# Patient Record
Sex: MALE | Race: WHITE
[De-identification: names, ages, dates, MRNs, and addresses within clinical notes are randomized per-mention and may not be internally consistent; named-entity substitution may affect disease eponyms.]

---

## 2018-04-13 ENCOUNTER — HOSPITAL ENCOUNTER (OUTPATIENT)
Dept: HOSPITAL 76 - LAB.WCP | Age: 73
Discharge: HOME | End: 2018-04-13
Attending: PHYSICIAN ASSISTANT
Payer: MEDICARE

## 2018-04-13 DIAGNOSIS — E78.5: ICD-10-CM

## 2018-04-13 DIAGNOSIS — Z12.5: ICD-10-CM

## 2018-04-13 DIAGNOSIS — I10: Primary | ICD-10-CM

## 2018-04-13 LAB
ALBUMIN DIAFP-MCNC: 4.4 G/DL (ref 3.2–5.5)
ALBUMIN/GLOB SERPL: 1.7 {RATIO} (ref 1–2.2)
ALP SERPL-CCNC: 62 IU/L (ref 42–121)
ALT SERPL W P-5'-P-CCNC: 23 IU/L (ref 10–60)
ANION GAP SERPL CALCULATED.4IONS-SCNC: 8 MMOL/L (ref 6–13)
AST SERPL W P-5'-P-CCNC: 20 IU/L (ref 10–42)
BASOPHILS NFR BLD AUTO: 0.1 10^3/UL (ref 0–0.1)
BASOPHILS NFR BLD AUTO: 1 %
BILIRUB BLD-MCNC: 1 MG/DL (ref 0.2–1)
BUN SERPL-MCNC: 20 MG/DL (ref 6–20)
CALCIUM UR-MCNC: 9.2 MG/DL (ref 8.5–10.3)
CHLORIDE SERPL-SCNC: 104 MMOL/L (ref 101–111)
CHOLEST SERPL-MCNC: 176 MG/DL
CO2 SERPL-SCNC: 26 MMOL/L (ref 21–32)
CREAT SERPLBLD-SCNC: 0.8 MG/DL (ref 0.6–1.2)
EOSINOPHIL # BLD AUTO: 0.2 10^3/UL (ref 0–0.7)
EOSINOPHIL NFR BLD AUTO: 3.4 %
ERYTHROCYTE [DISTWIDTH] IN BLOOD BY AUTOMATED COUNT: 13 % (ref 12–15)
GFRSERPLBLD MDRD-ARVRAT: 96 ML/MIN/{1.73_M2} (ref 89–?)
GLOBULIN SER-MCNC: 2.6 G/DL (ref 2.1–4.2)
GLUCOSE SERPL-MCNC: 99 MG/DL (ref 70–100)
HDLC SERPL-MCNC: 50 MG/DL
HDLC SERPL: 3.5 {RATIO} (ref ?–5)
HGB UR QL STRIP: 16.5 G/DL (ref 14–18)
LDLC SERPL CALC-MCNC: 92 MG/DL
LDLC/HDLC SERPL: 1.8 {RATIO} (ref ?–3.6)
LYMPHOCYTES # SPEC AUTO: 1.5 10^3/UL (ref 1.5–3.5)
LYMPHOCYTES NFR BLD AUTO: 26 %
MCH RBC QN AUTO: 32.1 PG (ref 27–31)
MCHC RBC AUTO-ENTMCNC: 34.2 G/DL (ref 32–36)
MCV RBC AUTO: 93.9 FL (ref 80–94)
MONOCYTES # BLD AUTO: 0.5 10^3/UL (ref 0–1)
MONOCYTES NFR BLD AUTO: 9.1 %
NEUTROPHILS # BLD AUTO: 3.5 10^3/UL (ref 1.5–6.6)
NEUTROPHILS # SNV AUTO: 5.7 X10^3/UL (ref 4.8–10.8)
NEUTROPHILS NFR BLD AUTO: 60.5 %
PDW BLD AUTO: 8.9 FL (ref 7.4–11.4)
PLATELET # BLD: 158 10^3/UL (ref 130–450)
PROT SPEC-MCNC: 7 G/DL (ref 6.7–8.2)
RBC MAR: 5.12 10^6/UL (ref 4.7–6.1)
SODIUM SERPLBLD-SCNC: 138 MMOL/L (ref 135–145)
VLDLC SERPL-SCNC: 34 MG/DL

## 2018-04-13 PROCEDURE — 80061 LIPID PANEL: CPT

## 2018-04-13 PROCEDURE — 85025 COMPLETE CBC W/AUTO DIFF WBC: CPT

## 2018-04-13 PROCEDURE — 83721 ASSAY OF BLOOD LIPOPROTEIN: CPT

## 2018-04-13 PROCEDURE — 80053 COMPREHEN METABOLIC PANEL: CPT

## 2018-04-13 PROCEDURE — 36415 COLL VENOUS BLD VENIPUNCTURE: CPT

## 2018-04-13 PROCEDURE — 84153 ASSAY OF PSA TOTAL: CPT

## 2018-10-04 ENCOUNTER — HOSPITAL ENCOUNTER (OUTPATIENT)
Dept: HOSPITAL 76 - LAB.WCP | Age: 73
End: 2018-10-04
Attending: PHYSICIAN ASSISTANT
Payer: MEDICARE

## 2018-10-04 DIAGNOSIS — I10: ICD-10-CM

## 2018-10-04 DIAGNOSIS — R73.9: Primary | ICD-10-CM

## 2018-10-04 DIAGNOSIS — E78.5: ICD-10-CM

## 2018-10-04 LAB
ALBUMIN DIAFP-MCNC: 4.3 G/DL (ref 3.2–5.5)
ALBUMIN/GLOB SERPL: 1.9 {RATIO} (ref 1–2.2)
ALP SERPL-CCNC: 64 IU/L (ref 42–121)
ALT SERPL W P-5'-P-CCNC: 21 IU/L (ref 10–60)
ANION GAP SERPL CALCULATED.4IONS-SCNC: 9 MMOL/L (ref 6–13)
AST SERPL W P-5'-P-CCNC: 21 IU/L (ref 10–42)
BILIRUB BLD-MCNC: 1.2 MG/DL (ref 0.2–1)
BUN SERPL-MCNC: 19 MG/DL (ref 6–20)
CALCIUM UR-MCNC: 9 MG/DL (ref 8.5–10.3)
CHLORIDE SERPL-SCNC: 103 MMOL/L (ref 101–111)
CHOLEST SERPL-MCNC: 151 MG/DL
CO2 SERPL-SCNC: 28 MMOL/L (ref 21–32)
CREAT SERPLBLD-SCNC: 0.8 MG/DL (ref 0.6–1.2)
GFRSERPLBLD MDRD-ARVRAT: 95 ML/MIN/{1.73_M2} (ref 89–?)
GLOBULIN SER-MCNC: 2.3 G/DL (ref 2.1–4.2)
GLUCOSE SERPL-MCNC: 96 MG/DL (ref 70–100)
HDLC SERPL-MCNC: 45 MG/DL
HDLC SERPL: 3.4 {RATIO} (ref ?–5)
LDLC SERPL CALC-MCNC: 74 MG/DL
LDLC/HDLC SERPL: 1.6 {RATIO} (ref ?–3.6)
PROT SPEC-MCNC: 6.6 G/DL (ref 6.7–8.2)
SODIUM SERPLBLD-SCNC: 140 MMOL/L (ref 135–145)
VLDLC SERPL-SCNC: 32 MG/DL

## 2018-10-04 PROCEDURE — 36415 COLL VENOUS BLD VENIPUNCTURE: CPT

## 2018-10-04 PROCEDURE — 80053 COMPREHEN METABOLIC PANEL: CPT

## 2018-10-04 PROCEDURE — 83721 ASSAY OF BLOOD LIPOPROTEIN: CPT

## 2018-10-04 PROCEDURE — 80061 LIPID PANEL: CPT

## 2019-04-01 ENCOUNTER — HOSPITAL ENCOUNTER (OUTPATIENT)
Dept: HOSPITAL 76 - LAB.WCP | Age: 74
Discharge: HOME | End: 2019-04-01
Attending: PHYSICIAN ASSISTANT
Payer: MEDICARE

## 2019-04-01 DIAGNOSIS — E78.5: Primary | ICD-10-CM

## 2019-04-01 DIAGNOSIS — M45.9: ICD-10-CM

## 2019-04-01 LAB
ALBUMIN DIAFP-MCNC: 4.2 G/DL (ref 3.2–5.5)
ALBUMIN/GLOB SERPL: 1.6 {RATIO} (ref 1–2.2)
ALP SERPL-CCNC: 64 IU/L (ref 42–121)
ALT SERPL W P-5'-P-CCNC: 23 IU/L (ref 10–60)
ANION GAP SERPL CALCULATED.4IONS-SCNC: 6 MMOL/L (ref 6–13)
AST SERPL W P-5'-P-CCNC: 23 IU/L (ref 10–42)
BASOPHILS NFR BLD AUTO: 0.1 10^3/UL (ref 0–0.1)
BASOPHILS NFR BLD AUTO: 1.7 %
BILIRUB BLD-MCNC: 1.3 MG/DL (ref 0.2–1)
BUN SERPL-MCNC: 19 MG/DL (ref 6–20)
CALCIUM UR-MCNC: 9.5 MG/DL (ref 8.5–10.3)
CHLORIDE SERPL-SCNC: 104 MMOL/L (ref 101–111)
CHOLEST SERPL-MCNC: 167 MG/DL
CO2 SERPL-SCNC: 27 MMOL/L (ref 21–32)
CREAT SERPLBLD-SCNC: 0.8 MG/DL (ref 0.6–1.2)
EOSINOPHIL # BLD AUTO: 0.1 10^3/UL (ref 0–0.7)
EOSINOPHIL NFR BLD AUTO: 2 %
ERYTHROCYTE [DISTWIDTH] IN BLOOD BY AUTOMATED COUNT: 13 % (ref 12–15)
GFRSERPLBLD MDRD-ARVRAT: 95 ML/MIN/{1.73_M2} (ref 89–?)
GLOBULIN SER-MCNC: 2.6 G/DL (ref 2.1–4.2)
GLUCOSE SERPL-MCNC: 103 MG/DL (ref 70–100)
HDLC SERPL-MCNC: 54 MG/DL
HDLC SERPL: 3.1 {RATIO} (ref ?–5)
HGB UR QL STRIP: 15.9 G/DL (ref 14–18)
LDLC SERPL CALC-MCNC: 81 MG/DL
LDLC/HDLC SERPL: 1.5 {RATIO} (ref ?–3.6)
LYMPHOCYTES # SPEC AUTO: 1.6 10^3/UL (ref 1.5–3.5)
LYMPHOCYTES NFR BLD AUTO: 24.6 %
MCH RBC QN AUTO: 32.4 PG (ref 27–31)
MCHC RBC AUTO-ENTMCNC: 34 G/DL (ref 32–36)
MCV RBC AUTO: 95.3 FL (ref 80–94)
MONOCYTES # BLD AUTO: 0.5 10^3/UL (ref 0–1)
MONOCYTES NFR BLD AUTO: 7.9 %
NEUTROPHILS # BLD AUTO: 4.2 10^3/UL (ref 1.5–6.6)
NEUTROPHILS # SNV AUTO: 6.5 X10^3/UL (ref 4.8–10.8)
NEUTROPHILS NFR BLD AUTO: 63.8 %
PDW BLD AUTO: 8.6 FL (ref 7.4–11.4)
PLATELET # BLD: 166 10^3/UL (ref 130–450)
PROT SPEC-MCNC: 6.8 G/DL (ref 6.7–8.2)
RBC MAR: 4.9 10^6/UL (ref 4.7–6.1)
SODIUM SERPLBLD-SCNC: 137 MMOL/L (ref 135–145)
VLDLC SERPL-SCNC: 32 MG/DL

## 2019-04-01 PROCEDURE — 83721 ASSAY OF BLOOD LIPOPROTEIN: CPT

## 2019-04-01 PROCEDURE — 36415 COLL VENOUS BLD VENIPUNCTURE: CPT

## 2019-04-01 PROCEDURE — 80053 COMPREHEN METABOLIC PANEL: CPT

## 2019-04-01 PROCEDURE — 80061 LIPID PANEL: CPT

## 2019-04-01 PROCEDURE — 85025 COMPLETE CBC W/AUTO DIFF WBC: CPT

## 2019-09-06 ENCOUNTER — HOSPITAL ENCOUNTER (OUTPATIENT)
Dept: HOSPITAL 76 - RT | Age: 74
Discharge: HOME | End: 2019-09-06
Attending: SURGERY
Payer: MEDICARE

## 2019-09-06 DIAGNOSIS — I10: Primary | ICD-10-CM

## 2019-09-06 PROCEDURE — 93005 ELECTROCARDIOGRAM TRACING: CPT

## 2019-09-10 ENCOUNTER — HOSPITAL ENCOUNTER (OUTPATIENT)
Dept: HOSPITAL 76 - SDS | Age: 74
Discharge: HOME | End: 2019-09-10
Attending: SURGERY
Payer: MEDICARE

## 2019-09-10 VITALS — DIASTOLIC BLOOD PRESSURE: 58 MMHG | SYSTOLIC BLOOD PRESSURE: 125 MMHG

## 2019-09-10 DIAGNOSIS — H54.3: ICD-10-CM

## 2019-09-10 DIAGNOSIS — D12.2: ICD-10-CM

## 2019-09-10 DIAGNOSIS — E78.5: ICD-10-CM

## 2019-09-10 DIAGNOSIS — M45.9: ICD-10-CM

## 2019-09-10 DIAGNOSIS — R73.9: ICD-10-CM

## 2019-09-10 DIAGNOSIS — Z12.11: Primary | ICD-10-CM

## 2019-09-10 DIAGNOSIS — I10: ICD-10-CM

## 2019-09-10 DIAGNOSIS — E66.9: ICD-10-CM

## 2019-09-10 DIAGNOSIS — D12.3: ICD-10-CM

## 2019-09-10 DIAGNOSIS — G47.30: ICD-10-CM

## 2019-09-10 DIAGNOSIS — Z90.49: ICD-10-CM

## 2019-09-10 PROCEDURE — 45380 COLONOSCOPY AND BIOPSY: CPT

## 2019-09-10 PROCEDURE — 0DBL8ZZ EXCISION OF TRANSVERSE COLON, VIA NATURAL OR ARTIFICIAL OPENING ENDOSCOPIC: ICD-10-PCS | Performed by: SURGERY

## 2019-09-10 PROCEDURE — 0DBK8ZZ EXCISION OF ASCENDING COLON, VIA NATURAL OR ARTIFICIAL OPENING ENDOSCOPIC: ICD-10-PCS | Performed by: SURGERY

## 2019-09-10 PROCEDURE — 45385 COLONOSCOPY W/LESION REMOVAL: CPT

## 2019-10-15 ENCOUNTER — HOSPITAL ENCOUNTER (OUTPATIENT)
Dept: HOSPITAL 76 - LAB.WCP | Age: 74
Discharge: HOME | End: 2019-10-15
Attending: PHYSICIAN ASSISTANT
Payer: MEDICARE

## 2019-10-15 DIAGNOSIS — E78.5: Primary | ICD-10-CM

## 2019-10-15 LAB
ALBUMIN DIAFP-MCNC: 4.3 G/DL (ref 3.2–5.5)
ALBUMIN/GLOB SERPL: 1.8 {RATIO} (ref 1–2.2)
ALP SERPL-CCNC: 57 IU/L (ref 42–121)
ALT SERPL W P-5'-P-CCNC: 22 IU/L (ref 10–60)
ANION GAP SERPL CALCULATED.4IONS-SCNC: 10 MMOL/L (ref 6–13)
AST SERPL W P-5'-P-CCNC: 19 IU/L (ref 10–42)
BILIRUB BLD-MCNC: 1 MG/DL (ref 0.2–1)
BUN SERPL-MCNC: 20 MG/DL (ref 6–20)
CALCIUM UR-MCNC: 9.2 MG/DL (ref 8.5–10.3)
CHLORIDE SERPL-SCNC: 103 MMOL/L (ref 101–111)
CHOLEST SERPL-MCNC: 178 MG/DL
CO2 SERPL-SCNC: 29 MMOL/L (ref 21–32)
CREAT SERPLBLD-SCNC: 0.9 MG/DL (ref 0.6–1.2)
GFRSERPLBLD MDRD-ARVRAT: 83 ML/MIN/{1.73_M2} (ref 89–?)
GLOBULIN SER-MCNC: 2.4 G/DL (ref 2.1–4.2)
GLUCOSE SERPL-MCNC: 101 MG/DL (ref 70–100)
HDLC SERPL-MCNC: 56 MG/DL
HDLC SERPL: 3.2 {RATIO} (ref ?–5)
LDLC SERPL CALC-MCNC: 93 MG/DL
LDLC/HDLC SERPL: 1.7 {RATIO} (ref ?–3.6)
PROT SPEC-MCNC: 6.7 G/DL (ref 6.7–8.2)
SODIUM SERPLBLD-SCNC: 142 MMOL/L (ref 135–145)
VLDLC SERPL-SCNC: 29 MG/DL

## 2019-10-15 PROCEDURE — 36415 COLL VENOUS BLD VENIPUNCTURE: CPT

## 2019-10-15 PROCEDURE — 80053 COMPREHEN METABOLIC PANEL: CPT

## 2019-10-15 PROCEDURE — 80061 LIPID PANEL: CPT

## 2019-10-15 PROCEDURE — 83721 ASSAY OF BLOOD LIPOPROTEIN: CPT

## 2020-04-13 ENCOUNTER — HOSPITAL ENCOUNTER (OUTPATIENT)
Dept: HOSPITAL 76 - LAB.WCP | Age: 75
Discharge: HOME | End: 2020-04-13
Attending: PHYSICIAN ASSISTANT
Payer: MEDICARE

## 2020-04-13 DIAGNOSIS — E78.5: Primary | ICD-10-CM

## 2020-04-13 LAB
ALBUMIN DIAFP-MCNC: 4.4 G/DL (ref 3.2–5.5)
ALBUMIN/GLOB SERPL: 1.6 {RATIO} (ref 1–2.2)
ALP SERPL-CCNC: 64 IU/L (ref 42–121)
ALT SERPL W P-5'-P-CCNC: 20 IU/L (ref 10–60)
ANION GAP SERPL CALCULATED.4IONS-SCNC: 6 MMOL/L (ref 6–13)
AST SERPL W P-5'-P-CCNC: 19 IU/L (ref 10–42)
BILIRUB BLD-MCNC: 0.8 MG/DL (ref 0.2–1)
BUN SERPL-MCNC: 19 MG/DL (ref 6–20)
CALCIUM UR-MCNC: 9.1 MG/DL (ref 8.5–10.3)
CHLORIDE SERPL-SCNC: 103 MMOL/L (ref 101–111)
CHOLEST SERPL-MCNC: 181 MG/DL
CO2 SERPL-SCNC: 27 MMOL/L (ref 21–32)
CREAT SERPLBLD-SCNC: 0.9 MG/DL (ref 0.6–1.2)
GLOBULIN SER-MCNC: 2.7 G/DL (ref 2.1–4.2)
GLUCOSE SERPL-MCNC: 91 MG/DL (ref 70–100)
HDLC SERPL-MCNC: 54 MG/DL
HDLC SERPL: 3.4 {RATIO} (ref ?–5)
LDLC SERPL CALC-MCNC: 90 MG/DL
LDLC/HDLC SERPL: 1.7 {RATIO} (ref ?–3.6)
PROT SPEC-MCNC: 7.1 G/DL (ref 6.7–8.2)
SODIUM SERPLBLD-SCNC: 136 MMOL/L (ref 135–145)
VLDLC SERPL-SCNC: 37 MG/DL

## 2020-04-13 PROCEDURE — 80053 COMPREHEN METABOLIC PANEL: CPT

## 2020-04-13 PROCEDURE — 80061 LIPID PANEL: CPT

## 2020-04-13 PROCEDURE — 36415 COLL VENOUS BLD VENIPUNCTURE: CPT

## 2020-04-13 PROCEDURE — 83721 ASSAY OF BLOOD LIPOPROTEIN: CPT

## 2020-10-13 ENCOUNTER — HOSPITAL ENCOUNTER (OUTPATIENT)
Dept: HOSPITAL 76 - LAB.WCP | Age: 75
Discharge: HOME | End: 2020-10-13
Attending: PHYSICIAN ASSISTANT
Payer: MEDICARE

## 2020-10-13 DIAGNOSIS — E78.5: Primary | ICD-10-CM

## 2020-10-13 LAB
ALBUMIN DIAFP-MCNC: 4.5 G/DL (ref 3.2–5.5)
ALBUMIN/GLOB SERPL: 1.7 {RATIO} (ref 1–2.2)
ALP SERPL-CCNC: 60 IU/L (ref 42–121)
ALT SERPL W P-5'-P-CCNC: 22 IU/L (ref 10–60)
ANION GAP SERPL CALCULATED.4IONS-SCNC: 8 MMOL/L (ref 6–13)
AST SERPL W P-5'-P-CCNC: 22 IU/L (ref 10–42)
BILIRUB BLD-MCNC: 1.3 MG/DL (ref 0.2–1)
BUN SERPL-MCNC: 21 MG/DL (ref 6–20)
CALCIUM UR-MCNC: 9.3 MG/DL (ref 8.5–10.3)
CHLORIDE SERPL-SCNC: 103 MMOL/L (ref 101–111)
CHOLEST SERPL-MCNC: 175 MG/DL
CO2 SERPL-SCNC: 27 MMOL/L (ref 21–32)
CREAT SERPLBLD-SCNC: 1 MG/DL (ref 0.6–1.2)
GLOBULIN SER-MCNC: 2.6 G/DL (ref 2.1–4.2)
GLUCOSE SERPL-MCNC: 96 MG/DL (ref 70–100)
HDLC SERPL-MCNC: 49 MG/DL
HDLC SERPL: 3.6 {RATIO} (ref ?–5)
LDLC SERPL CALC-MCNC: 104 MG/DL
LDLC/HDLC SERPL: 2.1 {RATIO} (ref ?–3.6)
PROT SPEC-MCNC: 7.1 G/DL (ref 6.7–8.2)
SODIUM SERPLBLD-SCNC: 138 MMOL/L (ref 135–145)
VLDLC SERPL-SCNC: 22 MG/DL

## 2020-10-13 PROCEDURE — 80053 COMPREHEN METABOLIC PANEL: CPT

## 2020-10-13 PROCEDURE — 80061 LIPID PANEL: CPT

## 2020-10-13 PROCEDURE — 36415 COLL VENOUS BLD VENIPUNCTURE: CPT

## 2020-10-13 PROCEDURE — 83721 ASSAY OF BLOOD LIPOPROTEIN: CPT

## 2020-11-21 ENCOUNTER — HOSPITAL ENCOUNTER (OUTPATIENT)
Age: 75
End: 2020-11-21
Payer: MEDICARE

## 2020-11-21 DIAGNOSIS — Z11.59: Primary | ICD-10-CM

## 2020-11-21 PROCEDURE — 87635 SARS-COV-2 COVID-19 AMP PRB: CPT

## 2020-11-24 ENCOUNTER — HOSPITAL ENCOUNTER (OUTPATIENT)
Age: 75
End: 2020-11-24
Payer: MEDICARE

## 2020-11-24 DIAGNOSIS — R07.9: Primary | ICD-10-CM

## 2020-11-24 DIAGNOSIS — I10: ICD-10-CM

## 2020-11-24 DIAGNOSIS — E78.5: ICD-10-CM

## 2020-11-24 PROCEDURE — 78452 HT MUSCLE IMAGE SPECT MULT: CPT

## 2020-11-24 PROCEDURE — 93017 CV STRESS TEST TRACING ONLY: CPT

## 2020-11-24 PROCEDURE — A9502 TC99M TETROFOSMIN: HCPCS

## 2020-11-25 NOTE — DI.NM.S_ITS
DATE OF SERVICE:  11/24/2020  
   
PROCEDURE:  Exercise perfusion study.  
   
INDICATIONS:  Chest pain with underlying hypertension and hyperlipidemia.  
   
RADIOPHARMACEUTICAL:  26.3 millicurie technetium-99m Myoview IV was injected at  
stress and 25.1 millicurie technetium-99m Myoview IV was injected at rest.  
   
CARDIAC STRESS:  The patient underwent exercise perfusion study under the  
supervision of an attending staff.  He walked on Mateo protocol for 9 minutes  
and achieved 94 percent of target heart rate and normal blood pressure response.  
No chest pain or anginal symptoms.  Baseline rhythm was sinus.  During stress,  
no ischemic changes.  Occasional PACs without any significant sustained  
arrhythmias.  The patient achieved 10.1 METs of workload and functional aerobic  
impairment -36 percent.  
   
RAW DATA:  There is increased subdiaphragmatic activity.  
   
GATED STUDY:  Resting LV ejection fraction 66 percent and stress LV ejection  
fraction 76 percent.  Resting end-diastolic volume 96 mL.  TID ratio 0.98, which  
is within normal limits.  Lung/heart ratio 0.32, which is within normal limits.  
   
MYOCARDIAL PERFUSION:  Stress supine, resting supine and stress prone images  
were compared to each other.  Stress supine and resting supine images revealed a  
small size, mildly decreased perfusion of basal inferior wall and inferoapex,  
which got completely resolved during prone images, suggestive of diaphragmatic  
tissue attenuation artifact.  Prone images revealed normal myocardial perfusion.  
   
CONCLUSION:  I will call this study a normal myocardial perfusion study with  
evidence of diaphragmatic tissue attenuation artifact ,which got resolved during  
prone images.  Excellent exercise capacity.  Next functional aerobic impairment  
-36 percent.  No ischemic EKG changes.  Normal hemodynamic response.  Preserved  
left ventricular function.  Overall this is a low-risk exercise perfusion study.  
   
   
   
Malik Judd - MRN: [id number]  
ARSEN/gautam/milind  
doc#: [id number]/job#:  [id number]  
dd: 11/25/2020 17:51:00 dt: 11/25/2020 20:20:00  
DICTATING MD/COPIES TO: Zo Lei MD; THEE He  
COPIES MNE: ANA;

## 2021-04-14 ENCOUNTER — HOSPITAL ENCOUNTER (OUTPATIENT)
Dept: HOSPITAL 76 - LAB.WCP | Age: 76
Discharge: HOME | End: 2021-04-14
Attending: PHYSICIAN ASSISTANT
Payer: MEDICARE

## 2021-04-14 DIAGNOSIS — R73.9: ICD-10-CM

## 2021-04-14 DIAGNOSIS — E78.5: Primary | ICD-10-CM

## 2021-04-14 LAB
ALBUMIN DIAFP-MCNC: 4.5 G/DL (ref 3.2–5.5)
ALBUMIN/GLOB SERPL: 1.9 {RATIO} (ref 1–2.2)
ALP SERPL-CCNC: 53 IU/L (ref 42–121)
ALT SERPL W P-5'-P-CCNC: 24 IU/L (ref 10–60)
ANION GAP SERPL CALCULATED.4IONS-SCNC: 8 MMOL/L (ref 6–13)
AST SERPL W P-5'-P-CCNC: 21 IU/L (ref 10–42)
BILIRUB BLD-MCNC: 1.2 MG/DL (ref 0.2–1)
BUN SERPL-MCNC: 22 MG/DL (ref 6–20)
CALCIUM UR-MCNC: 9.9 MG/DL (ref 8.5–10.3)
CHLORIDE SERPL-SCNC: 108 MMOL/L (ref 101–111)
CHOLEST SERPL-MCNC: 195 MG/DL
CO2 SERPL-SCNC: 27 MMOL/L (ref 21–32)
CREAT SERPLBLD-SCNC: 0.9 MG/DL (ref 0.6–1.2)
EST. AVERAGE GLUCOSE BLD GHB EST-MCNC: 111 MG/DL (ref 70–100)
GFRSERPLBLD MDRD-ARVRAT: 83 ML/MIN/{1.73_M2} (ref 89–?)
GLOBULIN SER-MCNC: 2.4 G/DL (ref 2.1–4.2)
GLUCOSE SERPL-MCNC: 100 MG/DL (ref 70–100)
HBA1C MFR BLD HPLC: 5.5 % (ref 4.27–6.07)
HDLC SERPL-MCNC: 57 MG/DL
HDLC SERPL: 3.4 {RATIO} (ref ?–5)
LDLC SERPL CALC-MCNC: 104 MG/DL
LDLC/HDLC SERPL: 1.8 {RATIO} (ref ?–3.6)
POTASSIUM SERPL-SCNC: 4.6 MMOL/L (ref 3.5–5)
PROT SPEC-MCNC: 6.9 G/DL (ref 6.7–8.2)
SODIUM SERPLBLD-SCNC: 143 MMOL/L (ref 135–145)
TRIGL P FAST SERPL-MCNC: 170 MG/DL
VLDLC SERPL-SCNC: 34 MG/DL

## 2021-04-14 PROCEDURE — 36415 COLL VENOUS BLD VENIPUNCTURE: CPT

## 2021-04-14 PROCEDURE — 83721 ASSAY OF BLOOD LIPOPROTEIN: CPT

## 2021-04-14 PROCEDURE — 80053 COMPREHEN METABOLIC PANEL: CPT

## 2021-04-14 PROCEDURE — 83036 HEMOGLOBIN GLYCOSYLATED A1C: CPT

## 2021-04-14 PROCEDURE — 80061 LIPID PANEL: CPT

## 2021-10-12 ENCOUNTER — HOSPITAL ENCOUNTER (OUTPATIENT)
Dept: HOSPITAL 76 - LAB.WCP | Age: 76
Discharge: HOME | End: 2021-10-12
Attending: PHYSICIAN ASSISTANT
Payer: MEDICARE

## 2021-10-12 DIAGNOSIS — E78.5: Primary | ICD-10-CM

## 2021-10-12 LAB
ALBUMIN DIAFP-MCNC: 4.5 G/DL (ref 3.2–5.5)
ALBUMIN/GLOB SERPL: 1.8 {RATIO} (ref 1–2.2)
ALP SERPL-CCNC: 52 IU/L (ref 42–121)
ALT SERPL W P-5'-P-CCNC: 21 IU/L (ref 10–60)
ANION GAP SERPL CALCULATED.4IONS-SCNC: 9 MMOL/L (ref 6–13)
AST SERPL W P-5'-P-CCNC: 22 IU/L (ref 10–42)
BILIRUB BLD-MCNC: 1.5 MG/DL (ref 0.2–1)
BUN SERPL-MCNC: 22 MG/DL (ref 6–20)
CALCIUM UR-MCNC: 9.6 MG/DL (ref 8.5–10.3)
CHLORIDE SERPL-SCNC: 102 MMOL/L (ref 101–111)
CHOLEST SERPL-MCNC: 170 MG/DL
CO2 SERPL-SCNC: 28 MMOL/L (ref 21–32)
CREAT SERPLBLD-SCNC: 1 MG/DL (ref 0.6–1.2)
GFRSERPLBLD MDRD-ARVRAT: 73 ML/MIN/{1.73_M2} (ref 89–?)
GLOBULIN SER-MCNC: 2.5 G/DL (ref 2.1–4.2)
GLUCOSE SERPL-MCNC: 100 MG/DL (ref 70–100)
HDLC SERPL-MCNC: 51 MG/DL
HDLC SERPL: 3.3 {RATIO} (ref ?–5)
LDLC SERPL CALC-MCNC: 92 MG/DL
LDLC/HDLC SERPL: 1.8 {RATIO} (ref ?–3.6)
POTASSIUM SERPL-SCNC: 4.4 MMOL/L (ref 3.5–5)
PROT SPEC-MCNC: 7 G/DL (ref 6.7–8.2)
SODIUM SERPLBLD-SCNC: 139 MMOL/L (ref 135–145)
TRIGL P FAST SERPL-MCNC: 133 MG/DL
VLDLC SERPL-SCNC: 27 MG/DL

## 2021-10-12 PROCEDURE — 83721 ASSAY OF BLOOD LIPOPROTEIN: CPT

## 2021-10-12 PROCEDURE — 80053 COMPREHEN METABOLIC PANEL: CPT

## 2021-10-12 PROCEDURE — 36415 COLL VENOUS BLD VENIPUNCTURE: CPT

## 2021-10-12 PROCEDURE — 80061 LIPID PANEL: CPT

## 2022-04-12 ENCOUNTER — HOSPITAL ENCOUNTER (OUTPATIENT)
Dept: HOSPITAL 76 - LAB.N | Age: 77
Discharge: HOME | End: 2022-04-12
Attending: PHYSICIAN ASSISTANT
Payer: MEDICARE

## 2022-04-12 DIAGNOSIS — E78.5: Primary | ICD-10-CM

## 2022-04-12 DIAGNOSIS — I10: ICD-10-CM

## 2022-04-12 LAB
ALBUMIN DIAFP-MCNC: 4.2 G/DL (ref 3.2–5.5)
ALBUMIN/GLOB SERPL: 1.5 {RATIO} (ref 1–2.2)
ALP SERPL-CCNC: 67 IU/L (ref 42–121)
ALT SERPL W P-5'-P-CCNC: 22 IU/L (ref 10–60)
ANION GAP SERPL CALCULATED.4IONS-SCNC: 8 MMOL/L (ref 6–13)
AST SERPL W P-5'-P-CCNC: 20 IU/L (ref 10–42)
BASOPHILS NFR BLD AUTO: 0.1 10^3/UL (ref 0–0.1)
BASOPHILS NFR BLD AUTO: 0.8 %
BILIRUB BLD-MCNC: 0.9 MG/DL (ref 0.2–1)
BUN SERPL-MCNC: 16 MG/DL (ref 6–20)
CALCIUM UR-MCNC: 9.6 MG/DL (ref 8.5–10.3)
CHLORIDE SERPL-SCNC: 102 MMOL/L (ref 101–111)
CHOLEST SERPL-MCNC: 174 MG/DL
CO2 SERPL-SCNC: 29 MMOL/L (ref 21–32)
CREAT SERPLBLD-SCNC: 1 MG/DL (ref 0.6–1.2)
EOSINOPHIL # BLD AUTO: 0.1 10^3/UL (ref 0–0.7)
EOSINOPHIL NFR BLD AUTO: 2 %
ERYTHROCYTE [DISTWIDTH] IN BLOOD BY AUTOMATED COUNT: 12.7 % (ref 12–15)
GFRSERPLBLD MDRD-ARVRAT: 73 ML/MIN/{1.73_M2} (ref 89–?)
GLOBULIN SER-MCNC: 2.8 G/DL (ref 2.1–4.2)
GLUCOSE SERPL-MCNC: 107 MG/DL (ref 70–100)
HCT VFR BLD AUTO: 49.7 % (ref 42–52)
HDLC SERPL-MCNC: 47 MG/DL
HDLC SERPL: 3.7 {RATIO} (ref ?–5)
HGB UR QL STRIP: 16.7 G/DL (ref 14–18)
LDLC SERPL CALC-MCNC: 72 MG/DL
LDLC/HDLC SERPL: 1.5 {RATIO} (ref ?–3.6)
LYMPHOCYTES # SPEC AUTO: 1.4 10^3/UL (ref 1.5–3.5)
LYMPHOCYTES NFR BLD AUTO: 21.8 %
MCH RBC QN AUTO: 32.4 PG (ref 27–31)
MCHC RBC AUTO-ENTMCNC: 33.6 G/DL (ref 32–36)
MCV RBC AUTO: 96.5 FL (ref 80–94)
MONOCYTES # BLD AUTO: 0.6 10^3/UL (ref 0–1)
MONOCYTES NFR BLD AUTO: 8.7 %
NEUTROPHILS # BLD AUTO: 4.3 10^3/UL (ref 1.5–6.6)
NEUTROPHILS # SNV AUTO: 6.5 X10^3/UL (ref 4.8–10.8)
NEUTROPHILS NFR BLD AUTO: 66.2 %
NRBC # BLD AUTO: 0 /100WBC
NRBC # BLD AUTO: 0 X10^3/UL
PDW BLD AUTO: 10.1 FL (ref 7.4–11.4)
PLATELET # BLD: 180 10^3/UL (ref 130–450)
POTASSIUM SERPL-SCNC: 4.6 MMOL/L (ref 3.5–5)
PROT SPEC-MCNC: 7 G/DL (ref 6.7–8.2)
RBC MAR: 5.15 10^6/UL (ref 4.7–6.1)
SODIUM SERPLBLD-SCNC: 139 MMOL/L (ref 135–145)
TRIGL P FAST SERPL-MCNC: 275 MG/DL
VLDLC SERPL-SCNC: 55 MG/DL

## 2022-04-12 PROCEDURE — 83721 ASSAY OF BLOOD LIPOPROTEIN: CPT

## 2022-04-12 PROCEDURE — 80061 LIPID PANEL: CPT

## 2022-04-12 PROCEDURE — 80053 COMPREHEN METABOLIC PANEL: CPT

## 2022-04-12 PROCEDURE — 36415 COLL VENOUS BLD VENIPUNCTURE: CPT

## 2022-04-12 PROCEDURE — 85025 COMPLETE CBC W/AUTO DIFF WBC: CPT

## 2022-10-28 ENCOUNTER — HOSPITAL ENCOUNTER (OUTPATIENT)
Age: 77
End: 2022-10-28
Payer: MEDICARE

## 2022-10-28 DIAGNOSIS — Z01.812: ICD-10-CM

## 2022-10-28 DIAGNOSIS — Z20.822: Primary | ICD-10-CM

## 2022-10-28 PROCEDURE — 87635 SARS-COV-2 COVID-19 AMP PRB: CPT

## 2022-10-31 ENCOUNTER — HOSPITAL ENCOUNTER (OUTPATIENT)
Age: 77
Discharge: HOME | End: 2022-10-31
Payer: MEDICARE

## 2022-10-31 VITALS
TEMPERATURE: 97.52 F | DIASTOLIC BLOOD PRESSURE: 63 MMHG | OXYGEN SATURATION: 97 % | RESPIRATION RATE: 11 BRPM | HEART RATE: 64 BPM | SYSTOLIC BLOOD PRESSURE: 117 MMHG

## 2022-10-31 VITALS
RESPIRATION RATE: 24 BRPM | HEART RATE: 65 BPM | SYSTOLIC BLOOD PRESSURE: 125 MMHG | DIASTOLIC BLOOD PRESSURE: 70 MMHG | OXYGEN SATURATION: 98 %

## 2022-10-31 VITALS
TEMPERATURE: 97.6 F | RESPIRATION RATE: 12 BRPM | HEART RATE: 62 BPM | SYSTOLIC BLOOD PRESSURE: 137 MMHG | OXYGEN SATURATION: 98 % | DIASTOLIC BLOOD PRESSURE: 66 MMHG

## 2022-10-31 VITALS
HEART RATE: 64 BPM | OXYGEN SATURATION: 99 % | DIASTOLIC BLOOD PRESSURE: 91 MMHG | SYSTOLIC BLOOD PRESSURE: 137 MMHG | RESPIRATION RATE: 14 BRPM

## 2022-10-31 VITALS
HEART RATE: 72 BPM | OXYGEN SATURATION: 99 % | RESPIRATION RATE: 19 BRPM | DIASTOLIC BLOOD PRESSURE: 73 MMHG | SYSTOLIC BLOOD PRESSURE: 143 MMHG

## 2022-10-31 VITALS — BODY MASS INDEX: 33.9 KG/M2

## 2022-10-31 VITALS
HEART RATE: 86 BPM | TEMPERATURE: 98.6 F | SYSTOLIC BLOOD PRESSURE: 167 MMHG | DIASTOLIC BLOOD PRESSURE: 79 MMHG | OXYGEN SATURATION: 97 % | RESPIRATION RATE: 16 BRPM

## 2022-10-31 DIAGNOSIS — Z12.11: Primary | ICD-10-CM

## 2022-10-31 DIAGNOSIS — Z86.010: ICD-10-CM

## 2022-10-31 DIAGNOSIS — D12.6: ICD-10-CM

## 2022-10-31 DIAGNOSIS — K64.9: ICD-10-CM

## 2022-10-31 PROCEDURE — 45384 COLONOSCOPY W/LESION REMOVAL: CPT

## 2022-10-31 PROCEDURE — 0DJD8ZZ INSPECTION OF LOWER INTESTINAL TRACT, VIA NATURAL OR ARTIFICIAL OPENING ENDOSCOPIC: ICD-10-PCS | Performed by: INTERNAL MEDICINE

## 2023-04-13 ENCOUNTER — HOSPITAL ENCOUNTER (OUTPATIENT)
Dept: HOSPITAL 76 - LAB.N | Age: 78
Discharge: HOME | End: 2023-04-13
Attending: PHYSICIAN ASSISTANT
Payer: MEDICARE

## 2023-04-13 DIAGNOSIS — E78.5: Primary | ICD-10-CM

## 2023-04-13 LAB
ALBUMIN DIAFP-MCNC: 4.5 G/DL (ref 3.2–5.5)
ALBUMIN/GLOB SERPL: 1.7 {RATIO} (ref 1–2.2)
ALP SERPL-CCNC: 64 IU/L (ref 42–121)
ALT SERPL W P-5'-P-CCNC: 23 IU/L (ref 10–60)
ANION GAP SERPL CALCULATED.4IONS-SCNC: 5 MMOL/L (ref 6–13)
AST SERPL W P-5'-P-CCNC: 22 IU/L (ref 10–42)
BILIRUB BLD-MCNC: 0.9 MG/DL (ref 0.2–1)
BUN SERPL-MCNC: 23 MG/DL (ref 6–20)
CALCIUM UR-MCNC: 9.2 MG/DL (ref 8.5–10.3)
CHLORIDE SERPL-SCNC: 107 MMOL/L (ref 101–111)
CHOLEST SERPL-MCNC: 193 MG/DL
CO2 SERPL-SCNC: 27 MMOL/L (ref 21–32)
CREAT SERPLBLD-SCNC: 0.9 MG/DL (ref 0.6–1.2)
GFRSERPLBLD MDRD-ARVRAT: 82 ML/MIN/{1.73_M2} (ref 89–?)
GLOBULIN SER-MCNC: 2.7 G/DL (ref 2.1–4.2)
GLUCOSE SERPL-MCNC: 95 MG/DL (ref 70–100)
HDLC SERPL-MCNC: 57 MG/DL
HDLC SERPL: 3.4 {RATIO} (ref ?–5)
LDLC SERPL CALC-MCNC: 108 MG/DL
LDLC/HDLC SERPL: 1.9 {RATIO} (ref ?–3.6)
POTASSIUM SERPL-SCNC: 4.3 MMOL/L (ref 3.5–5)
PROT SPEC-MCNC: 7.2 G/DL (ref 6.7–8.2)
SODIUM SERPLBLD-SCNC: 139 MMOL/L (ref 135–145)
TRIGL P FAST SERPL-MCNC: 139 MG/DL
VLDLC SERPL-SCNC: 28 MG/DL

## 2023-04-13 PROCEDURE — 80053 COMPREHEN METABOLIC PANEL: CPT

## 2023-04-13 PROCEDURE — 80061 LIPID PANEL: CPT

## 2023-04-13 PROCEDURE — 36415 COLL VENOUS BLD VENIPUNCTURE: CPT

## 2023-04-13 PROCEDURE — 83721 ASSAY OF BLOOD LIPOPROTEIN: CPT

## 2023-10-09 ENCOUNTER — HOSPITAL ENCOUNTER (OUTPATIENT)
Age: 78
Discharge: HOME | End: 2023-10-09
Payer: MEDICARE

## 2023-10-09 VITALS
DIASTOLIC BLOOD PRESSURE: 78 MMHG | SYSTOLIC BLOOD PRESSURE: 130 MMHG | TEMPERATURE: 97.4 F | OXYGEN SATURATION: 97 % | RESPIRATION RATE: 16 BRPM | HEART RATE: 73 BPM

## 2023-10-09 VITALS
RESPIRATION RATE: 23 BRPM | DIASTOLIC BLOOD PRESSURE: 76 MMHG | SYSTOLIC BLOOD PRESSURE: 156 MMHG | HEART RATE: 60 BPM | OXYGEN SATURATION: 100 %

## 2023-10-09 VITALS
OXYGEN SATURATION: 99 % | DIASTOLIC BLOOD PRESSURE: 71 MMHG | RESPIRATION RATE: 99 BRPM | SYSTOLIC BLOOD PRESSURE: 128 MMHG | HEART RATE: 64 BPM

## 2023-10-09 VITALS — BODY MASS INDEX: 32.8 KG/M2

## 2023-10-09 VITALS
OXYGEN SATURATION: 100 % | DIASTOLIC BLOOD PRESSURE: 93 MMHG | RESPIRATION RATE: 20 BRPM | SYSTOLIC BLOOD PRESSURE: 168 MMHG | HEART RATE: 64 BPM

## 2023-10-09 VITALS
RESPIRATION RATE: 11 BRPM | DIASTOLIC BLOOD PRESSURE: 80 MMHG | HEART RATE: 67 BPM | OXYGEN SATURATION: 97 % | SYSTOLIC BLOOD PRESSURE: 150 MMHG

## 2023-10-09 VITALS
OXYGEN SATURATION: 100 % | HEART RATE: 64 BPM | DIASTOLIC BLOOD PRESSURE: 85 MMHG | RESPIRATION RATE: 20 BRPM | SYSTOLIC BLOOD PRESSURE: 162 MMHG

## 2023-10-09 DIAGNOSIS — Z86.010: ICD-10-CM

## 2023-10-09 DIAGNOSIS — K63.89: ICD-10-CM

## 2023-10-09 DIAGNOSIS — Z12.11: Primary | ICD-10-CM

## 2023-10-09 PROCEDURE — 0DJ08ZZ INSPECTION OF UPPER INTESTINAL TRACT, VIA NATURAL OR ARTIFICIAL OPENING ENDOSCOPIC: ICD-10-PCS | Performed by: INTERNAL MEDICINE

## 2023-10-09 PROCEDURE — 45380 COLONOSCOPY AND BIOPSY: CPT

## 2023-10-09 PROCEDURE — 0DJD8ZZ INSPECTION OF LOWER INTESTINAL TRACT, VIA NATURAL OR ARTIFICIAL OPENING ENDOSCOPIC: ICD-10-PCS | Performed by: INTERNAL MEDICINE

## 2023-10-27 ENCOUNTER — HOSPITAL ENCOUNTER (OUTPATIENT)
Dept: HOSPITAL 76 - LAB.N | Age: 78
Discharge: HOME | End: 2023-10-27
Attending: PHYSICIAN ASSISTANT
Payer: MEDICARE

## 2023-10-27 DIAGNOSIS — E78.5: ICD-10-CM

## 2023-10-27 DIAGNOSIS — I10: Primary | ICD-10-CM

## 2023-10-27 LAB
ALBUMIN DIAFP-MCNC: 4.4 G/DL (ref 3.2–5.5)
ALBUMIN/GLOB SERPL: 2 {RATIO} (ref 1–2.2)
ALP SERPL-CCNC: 59 IU/L (ref 42–121)
ALT SERPL W P-5'-P-CCNC: 21 IU/L (ref 10–60)
ANION GAP SERPL CALCULATED.4IONS-SCNC: 7 MMOL/L (ref 6–13)
AST SERPL W P-5'-P-CCNC: 18 IU/L (ref 10–42)
BASOPHILS NFR BLD AUTO: 0.1 10^3/UL (ref 0–0.1)
BASOPHILS NFR BLD AUTO: 0.8 %
BILIRUB BLD-MCNC: 0.8 MG/DL (ref 0.2–1)
BUN SERPL-MCNC: 16 MG/DL (ref 6–20)
CALCIUM UR-MCNC: 9.3 MG/DL (ref 8.5–10.3)
CHLORIDE SERPL-SCNC: 107 MMOL/L (ref 101–111)
CHOLEST SERPL-MCNC: 166 MG/DL
CO2 SERPL-SCNC: 26 MMOL/L (ref 21–32)
CREAT SERPLBLD-SCNC: 0.9 MG/DL (ref 0.6–1.3)
EOSINOPHIL # BLD AUTO: 0.2 10^3/UL (ref 0–0.7)
EOSINOPHIL NFR BLD AUTO: 2.7 %
ERYTHROCYTE [DISTWIDTH] IN BLOOD BY AUTOMATED COUNT: 12.8 % (ref 12–15)
GFRSERPLBLD MDRD-ARVRAT: 82 ML/MIN/{1.73_M2} (ref 89–?)
GLOBULIN SER-MCNC: 2.2 G/DL (ref 2.1–4.2)
GLUCOSE SERPL-MCNC: 91 MG/DL (ref 74–104)
HCT VFR BLD AUTO: 47.5 % (ref 42–52)
HDLC SERPL-MCNC: 53 MG/DL
HDLC SERPL: 3.1 {RATIO} (ref ?–5)
HGB UR QL STRIP: 15.9 G/DL (ref 14–18)
LDLC SERPL CALC-MCNC: 87 MG/DL
LDLC/HDLC SERPL: 1.6 {RATIO} (ref ?–3.6)
LYMPHOCYTES # SPEC AUTO: 2 10^3/UL (ref 1.5–3.5)
LYMPHOCYTES NFR BLD AUTO: 31.9 %
MCH RBC QN AUTO: 32.4 PG (ref 27–31)
MCHC RBC AUTO-ENTMCNC: 33.5 G/DL (ref 32–36)
MCV RBC AUTO: 96.9 FL (ref 80–94)
MONOCYTES # BLD AUTO: 0.6 10^3/UL (ref 0–1)
MONOCYTES NFR BLD AUTO: 9 %
NEUTROPHILS # BLD AUTO: 3.4 10^3/UL (ref 1.5–6.6)
NEUTROPHILS # SNV AUTO: 6.2 X10^3/UL (ref 4.8–10.8)
NEUTROPHILS NFR BLD AUTO: 55.3 %
NRBC # BLD AUTO: 0 /100WBC
NRBC # BLD AUTO: 0 X10^3/UL
PDW BLD AUTO: 10.2 FL (ref 7.4–11.4)
PLATELET # BLD: 166 10^3/UL (ref 130–450)
POTASSIUM SERPL-SCNC: 4.1 MMOL/L (ref 3.5–4.5)
PROT SPEC-MCNC: 6.6 G/DL (ref 6.4–8.9)
RBC MAR: 4.9 10^6/UL (ref 4.7–6.1)
SODIUM SERPLBLD-SCNC: 140 MMOL/L (ref 135–145)
TRIGL P FAST SERPL-MCNC: 132 MG/DL (ref 48–352)
VLDLC SERPL-SCNC: 26 MG/DL

## 2023-10-27 PROCEDURE — 36415 COLL VENOUS BLD VENIPUNCTURE: CPT

## 2023-10-27 PROCEDURE — 83721 ASSAY OF BLOOD LIPOPROTEIN: CPT

## 2023-10-27 PROCEDURE — 80053 COMPREHEN METABOLIC PANEL: CPT

## 2023-10-27 PROCEDURE — 85025 COMPLETE CBC W/AUTO DIFF WBC: CPT

## 2023-10-27 PROCEDURE — 80061 LIPID PANEL: CPT

## 2023-12-01 ENCOUNTER — HOSPITAL ENCOUNTER (OUTPATIENT)
Dept: HOSPITAL 76 - DI.N | Age: 78
Discharge: HOME | End: 2023-12-01
Attending: NURSE PRACTITIONER
Payer: MEDICARE

## 2023-12-01 DIAGNOSIS — R05.9: Primary | ICD-10-CM

## 2023-12-01 NOTE — XRAY REPORT
PROCEDURE:  Chest 2 View X-Ray

 

INDICATIONS:  PRODUCTIVE COUGH

 

TECHNIQUE:  2 views of the chest were acquired.  

 

COMPARISON:  None.

 

FINDINGS:  

 

Surgical changes and devices:  None.  

 

Lungs and pleura:  No pleural effusions or pneumothorax.  Lungs are clear.  

 

Mediastinum:  Mediastinal contours appear normal.  Heart size is normal.  

 

Bones and chest wall:  No suspicious bony lesions.  Overlying soft tissues appear unremarkable. Mild 
multilevel degenerative changes of the spine. 

 

 

IMPRESSION:  

 

No acute cardiopulmonary process.

 

 

 

Reviewed by: Chino Lambert MD on 12/1/2023 7:29 PM PST

Approved by: Chino Lambert MD on 12/1/2023 7:29 PM PST

 

 

Station ID:  [id number]

## 2024-04-24 ENCOUNTER — HOSPITAL ENCOUNTER (OUTPATIENT)
Dept: HOSPITAL 76 - LAB.N | Age: 79
Discharge: HOME | End: 2024-04-24
Attending: PHYSICIAN ASSISTANT
Payer: MEDICARE

## 2024-04-24 DIAGNOSIS — E78.5: Primary | ICD-10-CM

## 2024-04-24 LAB
ALBUMIN DIAFP-MCNC: 4.5 G/DL (ref 3.2–5.5)
ALBUMIN/GLOB SERPL: 2 {RATIO} (ref 1–2.2)
ALP SERPL-CCNC: 61 IU/L (ref 42–121)
ALT SERPL W P-5'-P-CCNC: 22 IU/L (ref 10–60)
ANION GAP SERPL CALCULATED.4IONS-SCNC: 6 MMOL/L (ref 6–13)
AST SERPL W P-5'-P-CCNC: 18 IU/L (ref 10–42)
BILIRUB BLD-MCNC: 0.9 MG/DL (ref 0.2–1)
BUN SERPL-MCNC: 22 MG/DL (ref 6–20)
CALCIUM UR-MCNC: 9.9 MG/DL (ref 8.5–10.3)
CHLORIDE SERPL-SCNC: 106 MMOL/L (ref 101–111)
CHOLEST SERPL-MCNC: 169 MG/DL
CO2 SERPL-SCNC: 28 MMOL/L (ref 21–32)
CREAT SERPLBLD-SCNC: 1 MG/DL (ref 0.6–1.3)
GFRSERPLBLD MDRD-ARVRAT: 73 ML/MIN/{1.73_M2} (ref 89–?)
GLOBULIN SER-MCNC: 2.3 G/DL (ref 2.1–4.2)
GLUCOSE SERPL-MCNC: 107 MG/DL (ref 74–104)
HDLC SERPL-MCNC: 51 MG/DL
HDLC SERPL: 3.3 {RATIO} (ref ?–5)
LDLC SERPL CALC-MCNC: 82 MG/DL
LDLC/HDLC SERPL: 1.6 {RATIO} (ref ?–3.6)
POTASSIUM SERPL-SCNC: 4.5 MMOL/L (ref 3.5–4.5)
PROT SPEC-MCNC: 6.8 G/DL (ref 6.4–8.9)
SODIUM SERPLBLD-SCNC: 140 MMOL/L (ref 135–145)
TRIGL P FAST SERPL-MCNC: 180 MG/DL (ref 48–352)
VLDLC SERPL-SCNC: 36 MG/DL

## 2024-04-24 PROCEDURE — 80061 LIPID PANEL: CPT

## 2024-04-24 PROCEDURE — 36415 COLL VENOUS BLD VENIPUNCTURE: CPT

## 2024-04-24 PROCEDURE — 80053 COMPREHEN METABOLIC PANEL: CPT

## 2024-04-24 PROCEDURE — 83721 ASSAY OF BLOOD LIPOPROTEIN: CPT

## 2024-05-07 ENCOUNTER — HOSPITAL ENCOUNTER (OUTPATIENT)
Dept: HOSPITAL 76 - SC | Age: 79
Discharge: HOME | End: 2024-05-07
Attending: NURSE PRACTITIONER
Payer: MEDICARE

## 2024-05-07 VITALS — DIASTOLIC BLOOD PRESSURE: 69 MMHG | SYSTOLIC BLOOD PRESSURE: 155 MMHG | OXYGEN SATURATION: 98 %

## 2024-05-07 DIAGNOSIS — E66.9: ICD-10-CM

## 2024-05-07 DIAGNOSIS — G47.33: Primary | ICD-10-CM

## 2024-05-07 PROCEDURE — 99212 OFFICE O/P EST SF 10 MIN: CPT

## 2024-05-07 PROCEDURE — 99203 OFFICE O/P NEW LOW 30 MIN: CPT

## 2024-05-07 NOTE — SLEEP CARE CONSULTATION
Information from patient questionnaire entered by Denisha Henley.





I have reviewed and concur with the information entered by Denisha Henley. This 

document represents the service I personally performed and the decisions made by

me, Beth Maynard ARNP.





History of Present Illness


Service Date and Time: 05/07/2024    0825


Reason for Visit: New patient, Previously diagnosed sleep apnea, sleep apnea on 

CPAP therapy


Accompanied by: Spouse (Patrizia)


Chief Complaint: reports: Other (UPDATE SUPPLIES)


Date of Onset: 2008


Usual bedtime: 2300


Time it takes to fall asleep: HALF AN HR


Snores at night: Yes


Observed to quit breathing while asleep: Yes


Sleeps alone due to snoring: No


Number of times waking at night: 2


Reasons for waking at night: reports: Bathroom, Other (NOISE CAT)


Toss, Turn, or Twitch while sleeping: Yes


Recalls having dreams: Yes


Usually gets out of bed at: 0700


Feels refreshed in the morning: Yes


Morning headache: No


Sleepy or fatigued during the day: Yes


Ever fallen asleep while driving: No


Takes day naps: Yes


Dreams during day naps: No


Prior sleep studies: Yes


[place] and [place] 2008 [place] [facility] [place]


Additional HPI information: 





RAIZA GUAJARDO was previously diagnosed to have moderate, AHI 26.5, obstructive 

sleep apnea-hypopnea syndrome as seen in sleep study on 10/28/2008 through 

[facility] and comes in with spouse today with spouse to establish care for CPAP 

therapy.  He is blind. 





- Parasomnia Symptoms


Ever been unable to move upon waking from sleep: No


Walks in sleep: No


Talks in sleep: No


Ever acted out dreams in sleep: No


Ever felt weak in the knees when startled or emotional: No


Bothered by creepy, crawly, restless sensations in legs: Yes


Problems with memory or concentration: No





CPAP Compliance Data





- Data Reviewed with Patient


Average duration of nightly device use: 7 hours 27 minutes


Compliance rate %: 100 (90/90 days used)


Current pressure setting (cmH2O): 9


Average residual AHI: 2.7


Central apnea: 0.6


Obstructive apnea: 1


Hypopnea: 1


Average large leak: 1.7 L/min


Compliance data discussion: 





He has a ResMed Airsense 10, last updated 2/12/2018.  He has been using 

ii4b, his representative Allyssa has told him he is eligible for a 

new device but he needs an updated sleep study.  He is using a nasal cushion, 

Eson, medium cushion. 





Subjective


Patient concerns: denies: aerophagia, mask discomfort, air blowing in eyes, mask

leak noise, condensation in mask/hose, nasal congestion, dry mouth, nose, 

throat, epistaxis


Observed to snore while using device: No


Current pressure setting perceived as: comfortable (occaisonally feels low)


On therapy, patient: reports: sleeping better, awakening more refreshed, being 

more awake and alert during the day, more rested overall.  denies: drowsiness 

while driving (does not drive, blind)


Initial [place] Sleepiness Scale score: 5 (05/01/24)





Past Medical History


Past Medical History: reports: Hypertension





Social History


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 Patient is  and lives in [place]. 





Have you smoked in the past 12 months: No


Alcohol use: Yes


Alcohol amount and frequency: 1-2 GLASSES WEEKLY


Caffeine use: Yes


Caffeine amount and frequency: 1 MUG COFFEE EACH MORNING





Family History


Family history of sleep disordered breathing: Yes


Family Hx Sleep Apnea: Other: Snoring (SON), Sleep apnea - Treated





Allergies and Home Medications


Known drug allergies: No


Drug allergies reviewed: Yes


Home medication list reviewed: Yes (as listed)


Allergy and home medication list: 


Allergies





No Known Drug Allergies Allergy (Verified 05/01/24 13:50)


   


Home Medications











 Medication  Instructions  Recorded  Confirmed  Last Taken  Type


 


Ascorbic Acid [Vitamin C] 500 mg PO BID 09/09/19 05/07/24 Unknown History


 


Cholecalciferol (Vitamin D3) 2,000 unit PO DAILY 09/09/19 05/07/24 Unknown 

History





[Vitamin D]     


 


Doxycycline Hyclate 100 mg PO 09/09/19 09/09/19 History


 


Multivitamin [Multiple Vitamins] 1 each PO DAILY 09/09/19 05/07/24 Unknown 

History


 


Omega-3/Dha/Epa/Fish Oil [Fish Oil 1 each PO DAILY 09/09/19 05/07/24 Unknown 

History





1,000 mg Softgel]     


 


Pravastatin [Pravachol] 40 mg PO DAILY 09/09/19 05/07/24 09/09/19 History


 


Zolpidem [Ambien] 5 mg PO HS 09/09/19 05/07/24 09/09/19 History


 


amLODIPine [Norvasc] 5 mg PO DAILY 09/09/19 05/07/24 09/09/19 History


 


metroNIDAZOLE 0.75% GEL [Flagyl 0 gm TOP DAILY 09/09/19 05/07/24 09/09/19 

History





Gel]     


 


Lisinopril 30 mg ORAL DAILY 05/07/24 05/07/24 Unknown History











Review of Systems


Weight gain over past 5 years: 2-3


Cardiovascular: reports: high blood pressure


Neurological: reports: head trauma, other (BLIND)


Ear/Nose/Throat: reports: tonsillectomy, wisdom teeth removed





Physical Exam


Vital signs obtained and entered by: DENISHA ROBINS MA


Blood Pressure: 155/69 (LEFT ARM)


Cuff size: long


Heart Rate: 57


O2 Saturation: 98


Height: 5 ft 6 in


Weight: 214 lb


Body Mass Index: 34.5


BMI Classification: Obese


Neck circumference: 17.5


Heart: regular rate and rhythm


Lungs: clear bilaterally





Impression and Plan





1. Obstructive Sleep Apnea-Hypopnea Syndrome, moderate, with good treatment 

compliance and good apnea control. On CPAP therapy, the patient has better sleep

quality and is more rested overall.  He needs a new machine but his DME requires

a new sleep study to verify diagnosis and severity.  We will schedule him with a

sleep study. He prefers HST. He also feels sometimes like he could use a little 

more pressure.  The patients pressure will be changed to autoCPAP 10 cmH20 for 

patient comfort. Patient advised to contact me if pressure change is 

uncomfortable so that it can be adjusted. Goals for apnea control discussed.  

Patient's apnea severity and rationale for treatment to reduce apnea, improve 

sleep quality and reduce cardiovascular and cerebrovascular events was reviewed.

I also reviewed the benefit of consistent device use of CPAP for hypertension.





2. Obesity, unspecified. Currently patients BMI is 34.5.  Obesity increases the

risk of apnea, CPAP pressure requirements and overall health risks especially 

cardiovascular and diabetes. Thus patient is advised to lose weight. 





* PSG/HST to re-verify diagnosis and severity


* Change auto CPAP pressure to 10 cmH2O


* Notify me if snoring with mask or feeling that the pressure is too much or too

  little


* Attempt to lose weight


* Call this office if any problems using CPAP


* Return for follow up after sleep study, or sooner if concerns arise





Adjust device pressure to (cmH2O): 10


Counseling Topics: Spare mask, Weight loss health impact


Follow up with Sleep Care in: other (after sleep study completed)


Plan: 


PSG/HST and followup


Visit Type: In Office


Time Spent with Patient (minutes): 45


Provider Statement: I spent 100% of the Face to Face Visit with the patient with

greater than 50% spent counseling the patient and coordination of care.

## 2024-05-07 NOTE — SLEEP PATIENT INSTRUCTIONS
Sleep Center Visit Summary





- Patient Visit Information


Reason for Visit: 





Initial consultation





- Patient Instructions


Additional Instructions: 








You will continue with CPAP therapy with pressure changed to 10 cmH2O.





You will be completing a sleep study,  home sleep study (HST).  You will follow-

up in the sleep care office after the sleep study is completed to hear the 

results and talk about therapy, if needed.  





You will be called by our office staff to schedule this appointment, but you may

contact us with any questions.











- Clinic Information


Contact: 





[facility]


[address] [place] [address]


[email or web address]


T: [phone]

## 2024-06-03 ENCOUNTER — HOSPITAL ENCOUNTER (OUTPATIENT)
Dept: HOSPITAL 76 - SC | Age: 79
Discharge: HOME | End: 2024-06-03
Attending: NURSE PRACTITIONER
Payer: MEDICARE

## 2024-06-03 DIAGNOSIS — R09.02: ICD-10-CM

## 2024-06-03 DIAGNOSIS — G47.33: Primary | ICD-10-CM

## 2024-06-03 PROCEDURE — 95806 SLEEP STUDY UNATT&RESP EFFT: CPT

## 2024-07-09 ENCOUNTER — HOSPITAL ENCOUNTER (OUTPATIENT)
Dept: HOSPITAL 76 - SC | Age: 79
Discharge: HOME | End: 2024-07-09
Attending: NURSE PRACTITIONER
Payer: MEDICARE

## 2024-07-09 VITALS — DIASTOLIC BLOOD PRESSURE: 69 MMHG | SYSTOLIC BLOOD PRESSURE: 153 MMHG | OXYGEN SATURATION: 97 %

## 2024-07-09 DIAGNOSIS — E66.9: ICD-10-CM

## 2024-07-09 DIAGNOSIS — G47.33: Primary | ICD-10-CM

## 2024-07-09 DIAGNOSIS — R09.02: ICD-10-CM

## 2024-07-09 PROCEDURE — 99212 OFFICE O/P EST SF 10 MIN: CPT

## 2024-07-09 PROCEDURE — 99213 OFFICE O/P EST LOW 20 MIN: CPT

## 2024-07-09 NOTE — SLEEP PATIENT INSTRUCTIONS
Sleep Center Visit Summary





- Patient Visit Information


Reason for Visit: 





Sleep study follow-up





- Patient Instructions


Additional Instructions: 











You will continue with CPAP therapy with pressure set at 10 cmH2O.





Your sleep study showed severe obstructive sleep apnea. 





We encourage you to continue to try to lose weight. 





Please follow up with the sleep care office in 1 year.








- Clinic Information


Contact: 





[facility]


[address] [place] [address]


[email or web address]


T: [phone]

## 2024-09-13 ENCOUNTER — HOSPITAL ENCOUNTER (OUTPATIENT)
Dept: HOSPITAL 76 - SC | Age: 79
Discharge: HOME | End: 2024-09-13
Attending: NURSE PRACTITIONER
Payer: MEDICARE

## 2024-09-13 DIAGNOSIS — E66.9: ICD-10-CM

## 2024-09-13 DIAGNOSIS — G47.33: Primary | ICD-10-CM

## 2024-09-13 PROCEDURE — 99442: CPT

## 2024-09-13 NOTE — SLEEP CARE CONSULTATION
Information from patient questionnaire entered by Dolly Henley.





I have reviewed and concur with the information entered by Dolly Henley. This 

document represents the service I personally performed and the decisions made by

me, Beth Maynard ARNP.





History of Present Illness


Service Date and Time: 09/13/2024    1020


Previous diagnosis: Severe, Obstructive Sleep Apnea-Hypopnea Syndrome


AHI: 37.6 (6/2024)


Reason for follow up: first compliance after device update


Equipment type: CPAP (RESMED Airsense 11, S/U 07/22/24)


Equipment obtained from: Other ([facility]; getting supplies)


Mask style: Nasal


Mask brand: [employer] (Eson)


Backup mask available: No


Last cushion change: every 2 weeks


Prior sleep studies: Yes


Year and Where: 2008 [place], [place]; [facility] [place]


Type of Sleep Study: Home sleep study (COMPLETED 06/03/2024)


HPI additional information: 





RAIZA GUAJARDO was diagnosed to have severe, AHI 37.6, obstructive sleep apnea-

hypopnea syndrome and returned today for CPAP therapy first compliance after 

updating device follow-up.





Sleep Study





- Results


Type of Sleep Study: Home sleep study (COMPLETED 06/03/2024)


Prior sleep studies: Yes


Year and Where: 2008 [place], [place]; [facility]





CPAP Compliance Data





- Data Reviewed with Patient


Average duration of nightly device use: 7 HRS 10 MINS


Compliance rate %: 97 (07/22/24-08/20/24; 29/30 days used)


Current pressure setting (cmH2O): 10


Average residual AHI: 3.9


Central apnea: 1.8


Obstructive apnea: 0.7


Hypopnea: 1.4


Average large leak: 3.6 L/min





Subjective


Patient concerns: reports: condensation in mask/hose (minimal - adjusting heated

hose and humidity as needed).  denies: aerophagia, mask discomfort, air blowing 

in eyes, mask leak noise, nasal congestion, dry mouth, nose, throat, epistaxis


Observed to snore while using device: No


Current pressure setting perceived as: comfortable


On therapy, patient: reports: sleeping better, awakening more refreshed, being 

more awake and alert during the day, more rested overall.  denies: drowsiness 

while driving (he is blind - does not drive)


Initial [place] Sleepiness Scale score: 5 (05/01/24)


Current [place] Sleepiness Scale score: 7





Allergies and Home Medications


Known drug allergies: No


Drug allergies reviewed: Yes


Home medication list reviewed: Yes (no changes)


Allergy and home medication list: 


Allergies





No Known Drug Allergies Allergy (Verified 09/13/24 10:05)





Review of Systems


Review of systems same as previous: Yes (no changes)





Physical Exam


Vital signs obtained and entered by: DOLLY ROBINS MA


Height: 5 ft 7 in (PER PT)


Weight: 215 lb (PER PT)


Body Mass Index: 33.6


BMI Classification: Obese





Impression and Plan





1. Obstructive Sleep Apnea-Hypopnea Syndrome, severe, with good treatment 

compliance and good apnea control. On CPAP therapy, the patient has better sleep

quality and is more rested overall. He has significant improvement of his sleep 

apnea and is satisfied with current CPAP therapy. He has had minimal issues with

condensation and he is adjusting his heated hose or humidity as needed. He is 

comfortable with using the CPAP and intends to continue its use regularly.  

Patient's apnea severity and rationale for treatment to reduce apnea, improve 

sleep quality and reduce cardiovascular and cerebrovascular events was reviewed.

I also reviewed the benefit of consistent device use of CPAP for hypertension.





2. Obesity, unspecified. Currently patients BMI is 33.6.  Obesity increases the

risk of apnea, CPAP pressure requirements and overall health risks especially 

cardiovascular and diabetes. Thus patient is advised to lose weight. 





* Continue auto CPAP pressure at 10 cmH2O


* Notify me if snoring with mask or feeling that the pressure is too much or too

  little


* Attempt to lose weight


* Call this office if any problems using CPAP


* Return for follow up in 12 months, or sooner if concerns arise





Continue with device pressure at (cmH2O): 10


Counseling Topics: Spare mask, Weight loss health impact


Follow up with Sleep Care in: 1 year


Visit Type: Telehealth Phone


Video Type: TopTenREVIEWS


Patient Location: Home


Location of Provider: Office


Patient agrees and consents to this telehealth visit type: Yes


Patient agrees to have their insurance billed: Yes


Time Spent with Patient (minutes): 20


Provider Statement: I spent 100% of the Telehealth Phone Call with the patient 

with greater than 50% spent counseling the patient and coordination of care.

## 2025-02-19 NOTE — SLEEP CARE CONSULTATION
Information from patient questionnaire entered by Denisha Henley.





I have reviewed and concur with the information entered by Denisha Henley. This 

document represents the service I personally performed and the decisions made by

me, Beth Maynard ARNP.





History of Present Illness


Service Date and Time: 07/09/2024    1316


Accompanied by: Spouse (Patrizia)


Initial [place] Sleepiness Scale score: 5 (05/01/24)


Current [place] Sleepiness Scale score: 6


Additional HPI information: 





RAIZA GUAJARDO returns for follow up and results of the recently performed home 

sleep study.  The sleep study done on 6/3/2024 showed severe obstructive sleep 

apnea with an average AHI of 37.6 and juana oxygen saturation of 86%.  





I explained the pathophysiology behind obstructive sleep apnea. We then spent 

quite a bit of time discussing different treatment options.  For mild 

obstructive sleep apnea, surgery and oral appliance are alternatives to nasal 

CPAP therapy but in moderate or severe cases, nasal CPAP is the most effective 

and reliable treatment.  I reviewed the impact of weight changes on sleep apnea 

and strongly recommended losing weight.  





After some discussion, the patient will continue with the nasal CPAP therapy set

at 10 cmH20.  Patient counseled not drink alcohol less than 4 hours before 

bedtime as it can increase snoring and apnea.  Patient was cautioned about risks

of drowsy driving until sleepiness symptoms resolve.  He does not drive, he is 

blind. 





Sleep Study





- Results


Type of Sleep Study: Home sleep study (COMPLETED 06/03/2024)


Prior sleep studies: Yes


Year and Where: 2008 [place]; [facility] [place]


Polysomnography/Home Sleep Study results: 





Physician Impression:


The quality of the study is good. The length of the study is adequate (> 240 

minutes). Please also see the


tabulated and graphic data.


1. Obstructive Sleep Apnea-Hypopnea (ICD-10 G47.33), severe, with an AHI of 

37.6/hr and juana SaO2


of 86%. During the study, the patient had 228 apneas (228 obstructive, 0 

central, 0 mixed) and 22


hypopneas. The longest episode lasted 79.5 seconds. The respiratory events 

occurred slightly more


frequently during supine sleep (supine AHI was 39.0 and non-supine, 20.27).


2. Hypoxemia (ICD-10 R09.02), mild, with the lowest oxygen saturation of 86 % 

and 1.7 minutes with


SaO2 under 90%. Baseline oxygen saturation was normal (Average oxygen saturation

was 95%). 





Allergies and Home Medications


Known drug allergies: No


Drug allergies reviewed: Yes


Home medication list reviewed: Yes (no changes)


Allergy and home medication list: 


Allergies





No Known Drug Allergies Allergy (Verified 07/05/24 11:07)





Review of Systems


Review of systems same as previous: Yes (no changes)





Physical Exam


Vital signs obtained and entered by: BETH MCKEON-JULIENNE


Blood Pressure: 153/69


Cuff size: long (left arm)


Heart Rate: 52


O2 Saturation: 97


Height: 5 ft 6 in


Weight: 213 lb


Weight change since last visit: 1 lb loss


Body Mass Index: 34.3


BMI Classification: Obese





Impression and Plan





1. Obstructive Sleep Apnea-Hypopnea Syndrome, moderate, with lowest oxygen 

saturation of 86%. Obviously this is the cause of the patients symptoms of 

unrefreshed sleep, and excessive daytime sleepiness. Positive pressure therapy 

could continue to benefit hypertension.  The patient will be continued on nasal 

CPAP therapy with pressure set at 10 cmH2O. Compliance guidelines also reviewed.

He feels the pressure change was beneficial and is comfortable at this time.  He

just needs his results faxed to [facility] to update their records.





2. Hypoxemia, mild, with a juana oxygen saturation of 86% and 1.7 minutes spent 

under 90%. The baseline oxygen saturation was normal with an average oxygen 

saturation of 95%.





2. Obesity, unspecified. Currently patients BMI is 34.3.  Obesity increases the

risk of apnea, CPAP pressure requirements and overall health risks especially 

cardiovascular and diabetes. Thus patient is advised to lose weight. 





* Continue CPAP pressure at 10 cmH2O


* Notify me if snoring with mask or feeling that the pressure is too much or too

  little


* Attempt to lose weight


* Call this office if any problems using CPAP


* Return for follow up in 12 months, or sooner if concerns arise





Counseling Topics: Weight loss health impact


Follow up with Sleep Care in: 1 year


Visit Type: In Office


Time Spent with Patient (minutes): 22


Provider Statement: I spent 100% of the Face to Face Visit with the patient with

greater than 50% spent counseling the patient and coordination of care. Reviewed with Dr. Haywood.  She would like Eliquis and ASA until the 45 day ADY.  Can reevaluate at that point.      Spoke with son, Rodrigo to update.  Will hold off on Plavix until results from ADY are available.  They have enough Eliquis to get to that point.